# Patient Record
Sex: FEMALE | URBAN - METROPOLITAN AREA
[De-identification: names, ages, dates, MRNs, and addresses within clinical notes are randomized per-mention and may not be internally consistent; named-entity substitution may affect disease eponyms.]

---

## 2018-09-27 ENCOUNTER — APPOINTMENT (RX ONLY)
Dept: URBAN - METROPOLITAN AREA CLINIC 23 | Facility: CLINIC | Age: 73
Setting detail: DERMATOLOGY
End: 2018-09-27

## 2018-09-27 DIAGNOSIS — Z85.828 PERSONAL HISTORY OF OTHER MALIGNANT NEOPLASM OF SKIN: ICD-10-CM

## 2018-09-27 DIAGNOSIS — L81.4 OTHER MELANIN HYPERPIGMENTATION: ICD-10-CM

## 2018-09-27 DIAGNOSIS — D485 NEOPLASM OF UNCERTAIN BEHAVIOR OF SKIN: ICD-10-CM

## 2018-09-27 DIAGNOSIS — L82.1 OTHER SEBORRHEIC KERATOSIS: ICD-10-CM

## 2018-09-27 PROBLEM — E78.5 HYPERLIPIDEMIA, UNSPECIFIED: Status: ACTIVE | Noted: 2018-09-27

## 2018-09-27 PROBLEM — I10 ESSENTIAL (PRIMARY) HYPERTENSION: Status: ACTIVE | Noted: 2018-09-27

## 2018-09-27 PROBLEM — D48.5 NEOPLASM OF UNCERTAIN BEHAVIOR OF SKIN: Status: ACTIVE | Noted: 2018-09-27

## 2018-09-27 PROBLEM — E05.90 THYROTOXICOSIS, UNSPECIFIED WITHOUT THYROTOXIC CRISIS OR STORM: Status: ACTIVE | Noted: 2018-09-27

## 2018-09-27 PROCEDURE — ? RECOMMENDATIONS

## 2018-09-27 PROCEDURE — ? COUNSELING

## 2018-09-27 PROCEDURE — 11100: CPT

## 2018-09-27 PROCEDURE — ? BIOPSY BY SHAVE METHOD

## 2018-09-27 PROCEDURE — 99203 OFFICE O/P NEW LOW 30 MIN: CPT | Mod: 25

## 2018-09-27 ASSESSMENT — LOCATION ZONE DERM
LOCATION ZONE: TRUNK
LOCATION ZONE: FACE

## 2018-09-27 ASSESSMENT — LOCATION DETAILED DESCRIPTION DERM
LOCATION DETAILED: UPPER STERNUM
LOCATION DETAILED: STERNAL NOTCH
LOCATION DETAILED: LEFT INFERIOR CENTRAL MALAR CHEEK

## 2018-09-27 ASSESSMENT — LOCATION SIMPLE DESCRIPTION DERM
LOCATION SIMPLE: CHEST
LOCATION SIMPLE: LEFT CHEEK

## 2018-09-27 NOTE — PROCEDURE: RECOMMENDATIONS
Recommendations (Free Text): IPL face: $350 per session \\nVbeam: to combine with IPL. $150 per session for blood vessels \\n- Skin better even tone correcting cream. BID\\n- Retinol lite: use at bedtime
Detail Level: Zone

## 2018-09-27 NOTE — PROCEDURE: BIOPSY BY SHAVE METHOD
Depth Of Biopsy: dermis
Silver Nitrate Text: The wound bed was treated with silver nitrate after the biopsy was performed.
Biopsy Type: H and E
Notification Instructions: Patient will be notified of biopsy results. However, patient instructed to call the office if not contacted within 2 weeks.
X Size Of Lesion In Cm: 0
Lab: [facility]
Wound Care: Vaseline
Bill 12285 For Specimen Handling/Conveyance To Laboratory?: no
Dressing: bandage
Curettage Text: The wound bed was treated with curettage after the biopsy was performed.
Electrodesiccation Text: The wound bed was treated with electrodesiccation after the biopsy was performed.
Consent: Written consent was obtained and risks were reviewed including but not limited to scarring, infection, bleeding, scabbing, incomplete removal, nerve damage and allergy to anesthesia.
Was A Bandage Applied: Yes
Size Of Lesion In Cm: 0.5
Type Of Destruction Used: Curettage
Detail Level: Detailed
Electrodesiccation And Curettage Text: The wound bed was treated with electrodesiccation and curettage after the biopsy was performed.
Post-Care Instructions: I reviewed with the patient in detail post-care instructions. Patient is to keep the biopsy site dry overnight, and then apply bacitracin twice daily until healed. Patient may apply hydrogen peroxide soaks to remove any crusting.
Biopsy Method: 15 blade
Anesthesia Type: 1% lidocaine with epinephrine
Billing Type: [employer]
Cryotherapy Text: The wound bed was treated with cryotherapy after the biopsy was performed.
Lab Facility: [address] [facility]
Hemostasis: Electrocautery

## 2018-09-28 ENCOUNTER — APPOINTMENT (RX ONLY)
Dept: URBAN - METROPOLITAN AREA CLINIC 23 | Facility: CLINIC | Age: 73
Setting detail: DERMATOLOGY
End: 2018-09-28

## 2018-09-28 DIAGNOSIS — Z41.9 ENCOUNTER FOR PROCEDURE FOR PURPOSES OTHER THAN REMEDYING HEALTH STATE, UNSPECIFIED: ICD-10-CM

## 2018-09-28 PROCEDURE — ? LIMELIGHT IPL

## 2018-09-28 PROCEDURE — ? PULSED-DYE LASER

## 2018-09-28 ASSESSMENT — LOCATION DETAILED DESCRIPTION DERM: LOCATION DETAILED: LEFT INFERIOR CENTRAL MALAR CHEEK

## 2018-09-28 ASSESSMENT — LOCATION ZONE DERM: LOCATION ZONE: FACE

## 2018-09-28 ASSESSMENT — LOCATION SIMPLE DESCRIPTION DERM: LOCATION SIMPLE: LEFT CHEEK

## 2018-09-28 NOTE — PROCEDURE: PULSED-DYE LASER
Fluence In J/Cm2 (Optional): 6.50
Spot Size: 7 mm
Detail Level: Simple
Treated Area: small area
Cryogen Time (Ms): [address]
Delay Time (Ms): [address] [facility]
Delay Time (Ms): 20
Cryogen Time (Ms): 30
Spot Size: 10x3 mm
Post-Care Instructions: I reviewed with the patient in detail post-care instructions. Patient should stay away from the sun and wear sun protection until treated areas are fully healed.
Spot Size: 10 mm
Fluence In J/Cm2 (Optional): 11.00
Pulse Duration: 10 ms
Location Override: blood vessels
Pulse Duration: 6 ms
Consent: Written consent obtained, risks reviewed including but not limited to crusting, scabbing, blistering, scarring, darker or lighter pigmentary change, incidental hair removal, bruising, and/or incomplete removal.
Post-Procedure Care: Vaseline and ice applied. Post care reviewed with patient.
Laser Type: Vbeam 595nm

## 2018-09-28 NOTE — PROCEDURE: LIMELIGHT IPL
Sun Mode: On
External Cooling Fan Speed: 5
Program: A
Coolin C
Detail Level: Zone
Repetition Rate: 1 Hz
Spot Size(S): 10 x 30 mm
Number Of Passes: 1
Anesthesia Type: 1% lidocaine with epinephrine
Repetition Rate: 0.9 Hz
Repetition Rate: 2 Hz
Pre-Procedure: The treatment areas identified by the patient were cleansed, gel was applied and treatment was performed with the parameters mentioned above.
Fluence: 12
Sun Mode: Off
Consent: Written consent obtained, risks reviewed including but not limited to crusting, scabbing, blistering, scarring, darker or lighter pigmentary change, bruising, and/or incomplete response.
Post-Care Instructions: I reviewed with the patient in detail post-care instructions. Patient should stay away from the sun and wear sun protection until treated areas are fully healed.
Program: B
Fluence: 15
Post-Procedure: Following the procedure the post care instructions were reviewed with the patient.
Fluence: 10

## 2018-10-11 ENCOUNTER — APPOINTMENT (RX ONLY)
Dept: URBAN - METROPOLITAN AREA CLINIC 23 | Facility: CLINIC | Age: 73
Setting detail: DERMATOLOGY
End: 2018-10-11

## 2018-10-11 DIAGNOSIS — Z41.9 ENCOUNTER FOR PROCEDURE FOR PURPOSES OTHER THAN REMEDYING HEALTH STATE, UNSPECIFIED: ICD-10-CM

## 2018-10-11 PROCEDURE — ? HYDRAFACIAL

## 2018-10-11 ASSESSMENT — LOCATION ZONE DERM: LOCATION ZONE: FACE

## 2018-10-11 ASSESSMENT — LOCATION DETAILED DESCRIPTION DERM: LOCATION DETAILED: LEFT CENTRAL BUCCAL CHEEK

## 2018-10-11 ASSESSMENT — LOCATION SIMPLE DESCRIPTION DERM: LOCATION SIMPLE: LEFT CHEEK

## 2018-10-11 NOTE — PROCEDURE: HYDRAFACIAL
Detail Level: Zone
Indication: skin texture
Additional Vacuum Pressure (Won't Render If 0): 0
Price (Use Numbers Only, No Special Characters Or $): [address] [facility]
Glycolic Acid %: 7.5%
Consent: Written consent obtained, risks reviewed including but not limited to crusting, scabbing, blistering, scarring, darker or lighter pigmentary change, bruising, and/or incomplete response.
Number Of Passes: 2
Vacuum Pressure: [address] [facility]
Post-Care Instructions: I reviewed with the patient in detail post-care instructions. Patient should stay away from the sun and wear sun protection until treated areas are fully healed.
Treatment Number: 1

## 2018-10-17 ENCOUNTER — APPOINTMENT (RX ONLY)
Dept: URBAN - METROPOLITAN AREA CLINIC 23 | Facility: CLINIC | Age: 73
Setting detail: DERMATOLOGY
End: 2018-10-17

## 2018-10-17 DIAGNOSIS — Z41.9 ENCOUNTER FOR PROCEDURE FOR PURPOSES OTHER THAN REMEDYING HEALTH STATE, UNSPECIFIED: ICD-10-CM

## 2018-10-17 DIAGNOSIS — Z85.828 PERSONAL HISTORY OF OTHER MALIGNANT NEOPLASM OF SKIN: ICD-10-CM

## 2018-10-17 PROBLEM — D04.5 CARCINOMA IN SITU OF SKIN OF TRUNK: Status: ACTIVE | Noted: 2018-10-17

## 2018-10-17 PROCEDURE — ? FILLERS

## 2018-10-17 PROCEDURE — ? SUNSCREEN RECOMMENDATIONS

## 2018-10-17 PROCEDURE — 99213 OFFICE O/P EST LOW 20 MIN: CPT | Mod: 25

## 2018-10-17 PROCEDURE — 17261 DSTRJ MAL LES T/A/L .6-1.0CM: CPT

## 2018-10-17 PROCEDURE — ? CURETTAGE AND DESTRUCTION

## 2018-10-17 PROCEDURE — ? DYSPORT

## 2018-10-17 PROCEDURE — ? COUNSELING

## 2018-10-17 PROCEDURE — ? PULSED-DYE LASER

## 2018-10-17 NOTE — PROCEDURE: SUNSCREEN RECOMMENDATIONS

## 2018-10-17 NOTE — PROCEDURE: DYSPORT
Depressor Anguli Oris Units: 0
Consent: Written consent obtained. Risks include but not limited to lid/brow ptosis, bruising, swelling, diplopia, temporary effect, incomplete chemical denervation.
Additional Area 2 Units: [address] [facility]
Detail Level: Detailed
Glabellar Complex Units: 60
Post-Care Instructions: Patient instructed to not lie down for 4 hours, limit physical activity for 24 hours and avoid manipulation of the treated areas.
Expiration Date (Month Year): 05/31/2019
Dilution (U/ 0.1cc): 10
Additional Area 2 Location: Crows feet
Additional Area 1 Location: lip
Lot #: [id number]

## 2018-10-17 NOTE — PROCEDURE: CURETTAGE AND DESTRUCTION
Size Of Lesion After Curettage: 1
What Was Performed First?: Curettage
Bill As A Line Item Or As Units: Line Item
Cautery Type: electrodesiccation
Add Intralesional Injection: No
Anesthesia Type: 1% lidocaine with epinephrine
Number Of Curettages: 3
Consent was obtained from the patient. The risks, benefits and alternatives to therapy were discussed in detail. Specifically, the risks of infection, scarring, bleeding, prolonged wound healing, nerve injury, incomplete removal, allergy to anesthesia and recurrence were addressed. Alternatives to [facility], such as: surgical removal and XRT were also discussed. Prior to the procedure, the treatment site was clearly identified and confirmed by the patient. All components of Universal Protocol/PAUSE Rule completed.
Post-Care Instructions: I reviewed with the patient in detail post-care instructions. Patient is to keep the area dry for 48 hours, and not to engage in any swimming until the area is healed. Should the patient develop any fevers, chills, bleeding, severe pain patient will contact the office immediately.
Size Of Lesion In Cm: 0.4
Biopsy Photograph Reviewed: Yes
Additional Information: (Optional): The wound was cleaned, collagen powder was applied and a pressure dressing was applied. The patient received detailed post-op instructions.  1 packet  of collagen powder was given lot # [id number] Exp 02/2022
Anesthesia Volume In Cc: 0.5
Detail Level: Detailed

## 2018-10-17 NOTE — PROCEDURE: PULSED-DYE LASER
Delay Time (Ms): [address] [facility]
Fluence In J/Cm2 (Optional): 6.50
Pulse Duration: 10 ms
Delay Time (Ms): 20
Cryogen Time (Ms): [address]
Pulse Duration: 6 ms
Spot Size: 7 mm
Detail Level: Simple
Treated Area: small area
Post-Procedure Care: Vaseline and ice applied. Post care reviewed with patient.
Post-Care Instructions: I reviewed with the patient in detail post-care instructions. Patient should stay away from the sun and wear sun protection until treated areas are fully healed.
Cryogen Time (Ms): 30
Location Override: face
Consent: Written consent obtained, risks reviewed including but not limited to crusting, scabbing, blistering, scarring, darker or lighter pigmentary change, incidental hair removal, bruising, and/or incomplete removal.
Laser Type: Vbeam 595nm
Spot Size: 10 mm

## 2018-10-17 NOTE — PROCEDURE: FILLERS
Jawline Filler Volume In Cc: 0
Additional Area 5 Location: glabella
Lot #: [id number]
Include Cannula Brand?: DermaSculpt
Include Cannula Information In Note?: No
Additional Area 1 Volume In Cc: 1
Additional Anesthesia Volume In Cc: 6
Include Cannula Length?: 1.5 inch
Anesthesia Type: 1% lidocaine with epinephrine
Additional Area 1 Location: lat cheeks,marionette lines, perioral
Additional Area 2 Location: nasalabial folds, marionette lines,
Lot #: [id number]
Anesthesia Volume In Cc: 0.5
Map Statment: See [address] for Complete Details
Include Cannula Size?: 25G
Additional Area 4 Location: right tear though
Consent: Written consent obtained. Risks include but not limited to bruising, beading, irregular texture, ulceration, infection, allergic reaction, scar formation, incomplete augmentation, temporary nature, procedural pain.
Post-Care Instructions: Patient instructed to apply ice to reduce swelling.
Additional Area 1 Location: using the cannula to bilateral cheeks, NLF's, glabellar fine line
Price (Use Numbers Only, No Special Characters Or $): 0.00
Expiration Date (Month Year): 03/31/2021
Additional Area 3 Location: jawline
Detail Level: Zone
Filler: Meche Borrego
Include Cannula Information In Note?: Yes

## 2019-04-18 ENCOUNTER — APPOINTMENT (RX ONLY)
Dept: URBAN - METROPOLITAN AREA CLINIC 23 | Facility: CLINIC | Age: 74
Setting detail: DERMATOLOGY
End: 2019-04-18

## 2019-04-18 DIAGNOSIS — Z85.828 PERSONAL HISTORY OF OTHER MALIGNANT NEOPLASM OF SKIN: ICD-10-CM

## 2019-04-18 DIAGNOSIS — D485 NEOPLASM OF UNCERTAIN BEHAVIOR OF SKIN: ICD-10-CM

## 2019-04-18 DIAGNOSIS — L90.5 SCAR CONDITIONS AND FIBROSIS OF SKIN: ICD-10-CM

## 2019-04-18 PROBLEM — D48.5 NEOPLASM OF UNCERTAIN BEHAVIOR OF SKIN: Status: ACTIVE | Noted: 2019-04-18

## 2019-04-18 PROCEDURE — 11102 TANGNTL BX SKIN SINGLE LES: CPT

## 2019-04-18 PROCEDURE — 99213 OFFICE O/P EST LOW 20 MIN: CPT | Mod: 25

## 2019-04-18 PROCEDURE — ? COUNSELING

## 2019-04-18 PROCEDURE — ? BIOPSY BY SHAVE METHOD

## 2019-04-18 ASSESSMENT — LOCATION DETAILED DESCRIPTION DERM
LOCATION DETAILED: NASAL DORSUM
LOCATION DETAILED: STERNAL NOTCH

## 2019-04-18 ASSESSMENT — LOCATION ZONE DERM
LOCATION ZONE: NOSE
LOCATION ZONE: TRUNK

## 2019-04-18 ASSESSMENT — LOCATION SIMPLE DESCRIPTION DERM
LOCATION SIMPLE: CHEST
LOCATION SIMPLE: NOSE

## 2019-04-18 NOTE — PROCEDURE: BIOPSY BY SHAVE METHOD
Destruction After The Procedure: No
Additional Anesthesia Volume In Cc (Will Not Render If 0): 0
Lab: [facility]
Biopsy Type: H and E
Wound Care: Vaseline
Notification Instructions: Patient will be notified of biopsy results. However, patient instructed to call the office if not contacted within 2 weeks.
Cryotherapy Text: The wound bed was treated with cryotherapy after the biopsy was performed.
Detail Level: Detailed
Consent: The provider's intent is to obtain a tissue sample solely for diagnostic purposes. Written consent to obtain tissue sample was obtained and risks were reviewed including but not limited to scarring, infection, bleeding, scabbing, incomplete removal, nerve damage and allergy to anesthesia.
Anesthesia Volume In Cc (Will Not Render If 0): 0.2
Lab Facility: [address] [facility]
Was A Bandage Applied: Yes
Electrodesiccation Text: The wound bed was treated with electrodesiccation after the biopsy was performed.
Biopsy Method: 15 blade
Type Of Destruction Used: Curettage
Dressing: bandage
Hemostasis: Electrocautery
Electrodesiccation And Curettage Text: The wound bed was treated with electrodesiccation and curettage after the biopsy was performed.
Billing Type: [employer]
Post-Care Instructions: I reviewed with the patient in detail post-care instructions. Patient is to keep the biopsy site dry overnight, and then apply bacitracin twice daily until healed. Patient may apply hydrogen peroxide soaks to remove any crusting.
Silver Nitrate Text: The wound bed was treated with silver nitrate after the biopsy was performed.
Depth Of Biopsy: dermis
Curettage Text: The wound bed was treated with curettage after the biopsy was performed.
Anesthesia Type: 1% Xylocaine with 1:650026 epinephrine and sodium bicarbonate

## 2019-11-08 ENCOUNTER — APPOINTMENT (RX ONLY)
Dept: URBAN - METROPOLITAN AREA CLINIC 23 | Facility: CLINIC | Age: 74
Setting detail: DERMATOLOGY
End: 2019-11-08

## 2019-11-08 DIAGNOSIS — L82.0 INFLAMED SEBORRHEIC KERATOSIS: ICD-10-CM

## 2019-11-08 DIAGNOSIS — Z41.9 ENCOUNTER FOR PROCEDURE FOR PURPOSES OTHER THAN REMEDYING HEALTH STATE, UNSPECIFIED: ICD-10-CM

## 2019-11-08 DIAGNOSIS — Z85.828 PERSONAL HISTORY OF OTHER MALIGNANT NEOPLASM OF SKIN: ICD-10-CM

## 2019-11-08 PROCEDURE — ? DYSPORT

## 2019-11-08 PROCEDURE — ? DIAGNOSIS COMMENT

## 2019-11-08 PROCEDURE — ? COUNSELING

## 2019-11-08 PROCEDURE — ? LIQUID NITROGEN

## 2019-11-08 PROCEDURE — 17110 DESTRUCTION B9 LES UP TO 14: CPT

## 2019-11-08 PROCEDURE — ? CANDELA NORDLYS

## 2019-11-08 PROCEDURE — ? PULSED-DYE LASER

## 2019-11-08 PROCEDURE — 99213 OFFICE O/P EST LOW 20 MIN: CPT | Mod: 25

## 2019-11-08 PROCEDURE — ? FILLERS

## 2019-11-08 ASSESSMENT — LOCATION DETAILED DESCRIPTION DERM
LOCATION DETAILED: LEFT SUPERIOR FOREHEAD
LOCATION DETAILED: RIGHT SUPERIOR FOREHEAD
LOCATION DETAILED: NASAL DORSUM
LOCATION DETAILED: LEFT SUPERIOR MEDIAL FOREHEAD

## 2019-11-08 ASSESSMENT — LOCATION ZONE DERM
LOCATION ZONE: FACE
LOCATION ZONE: NOSE

## 2019-11-08 ASSESSMENT — LOCATION SIMPLE DESCRIPTION DERM
LOCATION SIMPLE: NOSE
LOCATION SIMPLE: LEFT FOREHEAD
LOCATION SIMPLE: RIGHT FOREHEAD

## 2019-11-08 NOTE — PROCEDURE: FILLERS
Decollete Filler  Volume In Cc: 0
Additional Area 1 Location: marionette lines, NLFâs.
Additional Area 2 Location: Remaining syringe will be injected in 7 days post hydase injections to tear through areas.
Include Cannula Size?: 25G
Include Cannula Length?: 1.5 inch
Filler: Restylane Refyne
Additional Area 3 Location: temples and brows
Additional Area 2 Location: oral commissures ,chin
Detail Level: Detailed
Price (Use Numbers Only, No Special Characters Or $): 0.00
Additional Area 4 Location: oral commissure, marionettes, lateral cheeks
Additional Area 3 Location: lateral cheeks, mid cheek
Additional Area 4 Volume In Cc: 1
Additional Area 5 Location: marionettphil, NFs, oral commissure
Additional Area 4 Location: vermillion lips, perioral lines
Lot #: [id number]
Use Map Statement For Sites (Optional): No
Expiration Date (Month Year): 08/31/2020
Lot #: [id number]
Lot #: [id number]
Lot #: [id number]
Anesthesia Type: 1% lidocaine with epinephrine
Include Cannula Information In Note?: Yes
Expiration Date (Month Year): 08/31/20
Map Statment: See [address] for Complete Details
Expiration Date (Month Year): 05/19/2020
Consent: Written consent obtained. Risks include but not limited to bruising, beading, irregular texture, ulceration, infection, allergic reaction, scar formation, incomplete augmentation, temporary nature, procedural pain.
Include Cannula Brand?: DermaSculpt
Expiration Date (Month Year): 09/30/20
Anesthesia Volume In Cc: 0.3
Post-Care Instructions: Patient instructed to apply ice to reduce swelling.
Additional Area 1 Location: lateral cheeks, marionette lines
Additional Area 1 Location: fine lines , perioral, oral commissures
Additional Anesthesia Volume In Cc: 6
Additional Area 1 Location: chin

## 2019-11-08 NOTE — PROCEDURE: CANDELA NORDLYS
Fluence In J/Cm2: 100
Post-Care Instructions: I reviewed with the patient in detail post-care instructions.
Pulse Duration In Ms (Will Not Render If Zero): 0
Eye Shielding Text (Leave Blank If Unwanted- Will Be Inserted If Selecting Eye Shields): The intraocular eye shields were placed. 2 drops of intraocular tetracaine HCL ophthalmic 0.5% solution was administered. The eye shields were coated with ophthalmic bacitracin prior to insertion. After the shields were removed the eyes were flushed with normal saline.
Applicator: [place] (530-750nm)
Detail Level: Zone
Fluence In J/Cm2: [address] [facility]
Spot Size (Optional): 1.5 nm
Fluence (Optional): 9 J/cm2
Fluence (Optional): 8 J/cm2
Passes (Will Not Render If Zero): 1
Pulse Time (Will Not Render If Zero): 5
Location: nose
Modality: SWT
Fluence (Optional): 15 J/cm2
Location: full face
Pulse Time (Will Not Render If Zero): 10
Location: cheeks
Location: lower lids
Consent: Written consent obtained, risks reviewed including but not limited to crusting, scabbing, blistering, scarring, darker or lighter pigmentary change, and/or incomplete removal.

## 2019-11-08 NOTE — PROCEDURE: PULSED-DYE LASER
Fluence In J/Cm2 (Optional): 6.50
Delay Time (Ms): 93[address] [facility]
Consent: Written consent obtained, risks reviewed including but not limited to crusting, scabbing, blistering, scarring, darker or lighter pigmentary change, incidental hair removal, bruising, and/or incomplete removal.
Delay Time (Ms): 0
Spot Size: 10 mm
Location Override: post injections
Pulse Duration: 10 ms
Cryogen Time (Ms): [address] [address]
Detail Level: Zone
Laser Type: Vbeam 595nm
Cryogen Time (Ms): 30
Post-Care Instructions: I reviewed with the patient in detail post-care instructions. Patient should stay away from the sun and wear sun protection until treated areas are fully healed.
Pulse Duration: 6 ms
Spot Size: 7 mm
Cryogen Time (Ms): [address]
Fluence In J/Cm2 (Optional): 6.00
Delay Time (Ms): 10
Treated Area: small area
Delay Time (Ms): 20

## 2019-11-08 NOTE — PROCEDURE: DYSPORT
Expiration Date (Month Year): 05/31/2020
Nasal Root Units: 0
Additional Area 1 Location: lateral brows
Additional Area 2 Location: chin
Additional Area 4 Location: crows feet
Additional Area 4 Units: [address]
Additional Area 6 Location: neck bands
Additional Area 6 Units: [address] [facility]
Post-Care Instructions: Patient instructed to not lie down for 4 hours, limit physical activity for 24 hours and avoid manipulation of the treated areas.
Glabellar Complex Units: 60
Additional Area 5 Location: left brow
Additional Area 5 Units: 10
Lot #: [id number]
Dilution (U/ 0.1cc): 1.5
Detail Level: Zone
Consent: Written consent obtained. Risks include but not limited to lid/brow ptosis, bruising, swelling, diplopia, temporary effect, incomplete chemical denervation.
Additional Area 3 Location: lateral eyes

## 2019-11-08 NOTE — PROCEDURE: LIQUID NITROGEN
Medical Necessity Clause: This procedure was medically necessary because the lesions that were treated were:
Render Note In Bullet Format When Appropriate: No
Medical Necessity Information: It is in your best interest to select a reason for this procedure from the list below. All of these items fulfill various CMS LCD requirements except the new and changing color options.
Duration Of Freeze Thaw-Cycle (Seconds): 10
Consent: The patient's consent was obtained including but not limited to risks of crusting, scabbing, blistering, scarring, darker or lighter pigmentary change, recurrence, incomplete removal and infection.
Detail Level: Detailed
Post-Care Instructions: I reviewed with the patient in detail post-care instructions. Patient is to wear sunprotection, and avoid picking at any of the treated lesions. Pt may apply Vaseline to crusted or scabbing areas.
Number Of Freeze-Thaw Cycles: 3 freeze-thaw cycles

## 2019-11-08 NOTE — HPI: COSMETIC (IPL)
Have You Had Laser Removal Of Brown Spots Before?: has had previous treatment
When Outside In The Sun, Do You...: always burns, tans with difficulty

## 2020-09-23 ENCOUNTER — APPOINTMENT (RX ONLY)
Dept: URBAN - METROPOLITAN AREA CLINIC 23 | Facility: CLINIC | Age: 75
Setting detail: DERMATOLOGY
End: 2020-09-23

## 2020-09-23 DIAGNOSIS — L81.4 OTHER MELANIN HYPERPIGMENTATION: ICD-10-CM

## 2020-09-23 DIAGNOSIS — L82.1 OTHER SEBORRHEIC KERATOSIS: ICD-10-CM

## 2020-09-23 DIAGNOSIS — Z85.828 PERSONAL HISTORY OF OTHER MALIGNANT NEOPLASM OF SKIN: ICD-10-CM

## 2020-09-23 DIAGNOSIS — L57.0 ACTINIC KERATOSIS: ICD-10-CM

## 2020-09-23 PROCEDURE — ? COUNSELING

## 2020-09-23 PROCEDURE — ? LIQUID NITROGEN

## 2020-09-23 PROCEDURE — 99214 OFFICE O/P EST MOD 30 MIN: CPT | Mod: 25

## 2020-09-23 PROCEDURE — 17000 DESTRUCT PREMALG LESION: CPT

## 2020-09-23 ASSESSMENT — LOCATION SIMPLE DESCRIPTION DERM: LOCATION SIMPLE: RIGHT SHOULDER

## 2020-09-23 ASSESSMENT — LOCATION ZONE DERM: LOCATION ZONE: ARM

## 2020-09-23 ASSESSMENT — LOCATION DETAILED DESCRIPTION DERM: LOCATION DETAILED: RIGHT ANTERIOR SHOULDER

## 2020-09-23 NOTE — PROCEDURE: LIQUID NITROGEN
Render Note In Bullet Format When Appropriate: No
Detail Level: Detailed
Consent: The patient's consent was obtained including but not limited to risks of crusting, scabbing, blistering, scarring, darker or lighter pigmentary change, recurrence, incomplete removal and infection.
Duration Of Freeze Thaw-Cycle (Seconds): 10
Post-Care Instructions: I reviewed with the patient in detail post-care instructions. Patient is to wear sunprotection, and avoid picking at any of the treated lesions. Pt may apply Vaseline to crusted or scabbing areas.
Number Of Freeze-Thaw Cycles: 3 freeze-thaw cycles

## 2020-09-23 NOTE — PROCEDURE: COUNSELING
Patient Specific Counseling (Will Not Stick From Patient To Patient): Recommend IPL face
Detail Level: Zone
Detail Level: Generalized
Detail Level: Detailed

## 2020-10-13 ENCOUNTER — APPOINTMENT (RX ONLY)
Dept: URBAN - METROPOLITAN AREA CLINIC 23 | Facility: CLINIC | Age: 75
Setting detail: DERMATOLOGY
End: 2020-10-13

## 2020-10-13 VITALS — TEMPERATURE: 97.9 F

## 2020-10-13 DIAGNOSIS — Z41.9 ENCOUNTER FOR PROCEDURE FOR PURPOSES OTHER THAN REMEDYING HEALTH STATE, UNSPECIFIED: ICD-10-CM

## 2020-10-13 PROCEDURE — ? DYSPORT

## 2020-10-13 PROCEDURE — ? CANDELA NORDLYS

## 2020-10-13 NOTE — PROCEDURE: DYSPORT
Dilution (U/ 0.1cc): 1.5
Levator Labii Superioris Units: 0
Show Additional Area 5: Yes
Additional Area 6 Location: chin
Additional Area 1 Location: glabella
Show Lcl Units: No
Additional Area 3 Location: crows feet
Detail Level: Detailed
Additional Area 1 Units: 2[address] [facility]
Additional Area 5 Location: high forehead
Additional Area 2 Location: forehead
Consent: Written consent obtained. Risks include but not limited to lid/brow ptosis, bruising, swelling, diplopia, temporary effect, incomplete chemical denervation.
Expiration Date (Month Year): 02/28/21
Lot #: [id number]
Additional Area 4 Location: neck bands
Post-Care Instructions: Patient instructed to not lie down for 4 hours, limit physical activity for 24 hours and avoid manipulation of the treated areas.

## 2020-10-13 NOTE — PROCEDURE: CANDELA NORDLYS
Applicator: [place] (530-750nm)
Pulse Duration In Ms (Will Not Render If Zero): 12
[facility]
Fluence In J/Cm2: [address]
Fluence (Optional): 7 J/cm2
Pulse Time (Will Not Render If Zero): 5
Pulse Duration In Ms (Will Not Render If Zero): 2
Pulse Duration In Ms (Will Not Render If Zero): 0
Fluence (Optional): 9 J/cm2
Topical Anesthesia?: 20% benzocaine, 8% lidocaine, 4% tetracaine
Spot Size (Optional): 3 nm
Pulse Time (Will Not Render If Zero): 10
Passes (Will Not Render If Zero): 1
Consent: Written consent obtained, risks reviewed including but not limited to crusting, scabbing, blistering, scarring, darker or lighter pigmentary change, and/or incomplete removal.
Length Topical Anesthesia Applied (Optional): 15 minutes
Post-Care Instructions: I reviewed with the patient in detail post-care instructions.
Eye Shielding Text (Leave Blank If Unwanted- Will Be Inserted If Selecting Eye Shields): The intraocular eye shields were placed. 2 drops of intraocular tetracaine HCL ophthalmic 0.5% solution was administered. The eye shields were coated with ophthalmic bacitracin prior to insertion. After the shields were removed the eyes were flushed with normal saline.
Modality: SWT
Location: forehead
Location: cheeks
Location: full face
Fluence In J/Cm2: 100
Detail Level: Detailed